# Patient Record
Sex: MALE | Race: WHITE | Employment: OTHER | URBAN - METROPOLITAN AREA
[De-identification: names, ages, dates, MRNs, and addresses within clinical notes are randomized per-mention and may not be internally consistent; named-entity substitution may affect disease eponyms.]

---

## 2018-04-08 PROBLEM — J98.9 EXTREME OBESITY WITH RESPIRATORY DISORDER (HCC): Status: ACTIVE | Noted: 2018-04-08

## 2018-04-08 PROBLEM — I10 BENIGN HYPERTENSION: Status: ACTIVE | Noted: 2018-04-08

## 2018-04-08 PROBLEM — E11.9 TYPE 2 DIABETES MELLITUS WITHOUT COMPLICATION, WITHOUT LONG-TERM CURRENT USE OF INSULIN (HCC): Status: ACTIVE | Noted: 2018-04-08

## 2018-04-08 PROBLEM — M51.37 DEGENERATION OF LUMBAR OR LUMBOSACRAL INTERVERTEBRAL DISC: Status: ACTIVE | Noted: 2018-04-08

## 2018-04-08 PROBLEM — M51.379 DEGENERATION OF LUMBAR OR LUMBOSACRAL INTERVERTEBRAL DISC: Status: ACTIVE | Noted: 2018-04-08

## 2018-04-08 PROBLEM — G47.33 OBSTRUCTIVE SLEEP APNEA (ADULT) (PEDIATRIC): Status: ACTIVE | Noted: 2018-04-08

## 2018-04-08 PROBLEM — I87.303 CHRONIC VENOUS HYPERTENSION INVOLVING BOTH SIDES: Status: ACTIVE | Noted: 2018-04-08

## 2018-04-08 PROBLEM — E78.1 PURE HYPERGLYCERIDEMIA: Status: ACTIVE | Noted: 2018-04-08

## 2018-04-08 PROBLEM — I50.32 CHRONIC DIASTOLIC HEART FAILURE (HCC): Status: ACTIVE | Noted: 2018-04-08

## 2018-04-08 PROBLEM — E66.01 EXTREME OBESITY WITH RESPIRATORY DISORDER (HCC): Status: ACTIVE | Noted: 2018-04-08

## 2018-04-08 PROBLEM — L57.0 ACTINIC KERATOSIS: Status: ACTIVE | Noted: 2018-04-08

## 2018-06-04 PROBLEM — N18.30 CKD (CHRONIC KIDNEY DISEASE) STAGE 3, GFR 30-59 ML/MIN (HCC): Status: ACTIVE | Noted: 2018-06-04

## 2018-07-19 PROCEDURE — 86803 HEPATITIS C AB TEST: CPT | Performed by: INTERNAL MEDICINE

## 2019-07-23 PROBLEM — E66.2 CLASS 3 OBESITY WITH ALVEOLAR HYPOVENTILATION, SERIOUS COMORBIDITY, AND BODY MASS INDEX (BMI) OF 45.0 TO 49.9 IN ADULT (HCC): Status: ACTIVE | Noted: 2019-07-23

## 2019-07-23 PROBLEM — E66.813 CLASS 3 OBESITY WITH ALVEOLAR HYPOVENTILATION, SERIOUS COMORBIDITY, AND BODY MASS INDEX (BMI) OF 45.0 TO 49.9 IN ADULT (HCC): Status: ACTIVE | Noted: 2019-07-23

## 2019-12-01 PROBLEM — E55.9 HYPOVITAMINOSIS D: Status: ACTIVE | Noted: 2019-12-01

## 2020-08-13 PROBLEM — I48.0 PAF (PAROXYSMAL ATRIAL FIBRILLATION) (HCC): Status: ACTIVE | Noted: 2020-08-13

## 2021-10-14 PROBLEM — E66.01 OBESITY, MORBID (HCC): Status: ACTIVE | Noted: 2019-07-23

## 2022-01-06 PROBLEM — E11.65 TYPE 2 DIABETES MELLITUS WITH HYPERGLYCEMIA, UNSPECIFIED WHETHER LONG TERM INSULIN USE (HCC): Status: ACTIVE | Noted: 2022-01-06

## 2022-01-06 PROBLEM — C61 PROSTATE CANCER (HCC): Status: ACTIVE | Noted: 2022-01-06

## 2025-04-28 RX ORDER — INSULIN DEGLUDEC 100 U/ML
44 INJECTION, SOLUTION SUBCUTANEOUS DAILY
COMMUNITY

## 2025-04-29 ENCOUNTER — ANESTHESIA (OUTPATIENT)
Dept: ENDOSCOPY | Facility: HOSPITAL | Age: 75
End: 2025-04-29
Payer: MEDICARE

## 2025-04-29 ENCOUNTER — ANESTHESIA EVENT (OUTPATIENT)
Dept: ENDOSCOPY | Facility: HOSPITAL | Age: 75
End: 2025-04-29
Payer: MEDICARE

## 2025-04-29 ENCOUNTER — HOSPITAL ENCOUNTER (OUTPATIENT)
Facility: HOSPITAL | Age: 75
Setting detail: HOSPITAL OUTPATIENT SURGERY
Discharge: HOME | End: 2025-04-29
Attending: INTERNAL MEDICINE | Admitting: INTERNAL MEDICINE
Payer: MEDICARE

## 2025-04-29 VITALS
HEART RATE: 56 BPM | HEIGHT: 68 IN | DIASTOLIC BLOOD PRESSURE: 60 MMHG | BODY MASS INDEX: 42.13 KG/M2 | OXYGEN SATURATION: 95 % | RESPIRATION RATE: 21 BRPM | WEIGHT: 278 LBS | SYSTOLIC BLOOD PRESSURE: 139 MMHG

## 2025-04-29 DIAGNOSIS — Z12.11 SCREENING FOR COLON CANCER: ICD-10-CM

## 2025-04-29 LAB — GLUCOSE BLDC GLUCOMTR-MCNC: 107 MG/DL (ref 70–99)

## 2025-04-29 PROCEDURE — 88305 TISSUE EXAM BY PATHOLOGIST: CPT | Performed by: INTERNAL MEDICINE

## 2025-04-29 PROCEDURE — 82962 GLUCOSE BLOOD TEST: CPT

## 2025-04-29 RX ORDER — SODIUM CHLORIDE, SODIUM LACTATE, POTASSIUM CHLORIDE, CALCIUM CHLORIDE 600; 310; 30; 20 MG/100ML; MG/100ML; MG/100ML; MG/100ML
INJECTION, SOLUTION INTRAVENOUS CONTINUOUS
Status: DISCONTINUED | OUTPATIENT
Start: 2025-04-29 | End: 2025-04-29

## 2025-04-29 RX ORDER — NALOXONE HYDROCHLORIDE 0.4 MG/ML
0.08 INJECTION, SOLUTION INTRAMUSCULAR; INTRAVENOUS; SUBCUTANEOUS ONCE AS NEEDED
Status: DISCONTINUED | OUTPATIENT
Start: 2025-04-29 | End: 2025-04-29

## 2025-04-29 RX ORDER — PHENYLEPHRINE HCL 10 MG/ML
VIAL (ML) INJECTION AS NEEDED
Status: DISCONTINUED | OUTPATIENT
Start: 2025-04-29 | End: 2025-04-29 | Stop reason: SURG

## 2025-04-29 RX ORDER — LIDOCAINE HYDROCHLORIDE 10 MG/ML
INJECTION, SOLUTION EPIDURAL; INFILTRATION; INTRACAUDAL; PERINEURAL AS NEEDED
Status: DISCONTINUED | OUTPATIENT
Start: 2025-04-29 | End: 2025-04-29 | Stop reason: SURG

## 2025-04-29 RX ADMIN — PHENYLEPHRINE HCL 200 MCG: 10 MG/ML VIAL (ML) INJECTION at 11:08:00

## 2025-04-29 RX ADMIN — LIDOCAINE HYDROCHLORIDE 100 MG: 10 INJECTION, SOLUTION EPIDURAL; INFILTRATION; INTRACAUDAL; PERINEURAL at 10:54:00

## 2025-04-29 NOTE — ANESTHESIA PREPROCEDURE EVALUATION
Anesthesia PreOp Note    HPI:     Haider Negro is a 75 year old male who presents for preoperative consultation requested by: Marie Rojo MD    Date of Surgery: 4/29/2025    Procedure(s):  COLONOSCOPY  Indication: Screening for colon cancer    Relevant Problems   No relevant active problems       NPO:                         History Review:  Patient Active Problem List    Diagnosis Date Noted    Type 2 diabetes mellitus with hyperglycemia, unspecified whether long term insulin use ([facility]) 01/06/2022    Prostate cancer ([facility]) 01/06/2022    PAF (paroxysmal atrial fibrillation) ([facility]) 08/13/2020    Hypovitaminosis D 12/01/2019    Obesity, morbid ([facility]) 07/23/2019    CKD (chronic kidney disease) stage 3, GFR 30-59 ml/min ([facility]) 06/04/2018    Benign hypertension 04/08/2018    Actinic keratosis 04/08/2018    Chronic venous hypertension involving both sides 04/08/2018    Obstructive sleep apnea (adult) (pediatric) 04/08/2018    Degeneration of lumbar or lumbosacral intervertebral disc 04/08/2018    Pure hyperglyceridemia 04/08/2018    Type 2 diabetes mellitus without complication, without long-term current use of insulin ([facility]) 04/08/2018    Chronic diastolic heart failure ([facility]) 04/08/2018       Past Medical History[1]    Past Surgical History[2]    Prescriptions Prior to Admission[3]  Current Medications and Prescriptions Ordered in Epic[4]    Allergies[5]    Family History[6]  Social Hx on file[7]    Available pre-op labs reviewed.             Vital Signs:  Body mass index is 42.27 kg/m².   height is 1.727 m (5' 8\") and weight is 126.1 kg (278 lb).   Vitals:    04/22/25 1317   Weight: 126.1 kg (278 lb)   Height: 1.727 m (5' 8\")        Anesthesia Evaluation     Patient summary reviewed and Nursing notes reviewed    Airway   Mallampati: II  TM distance: >3 FB  Neck ROM: full  Dental - Dentition appears grossly intact     Pulmonary - normal exam   (+) sleep apnea  Cardiovascular - normal exam  (+) hypertension,  dysrhythmias, CHF    ROS comment: Normal stress test in 2020    Neuro/Psych      GI/Hepatic/Renal      Comments: CKD stage 3    Endo/Other    (+) diabetes mellitus    Comments: M. OBESE  Abdominal   (+) obese                 Anesthesia Plan:   ASA:  3  Plan:   MAC  Informed Consent Plan and Risks Discussed With:  Patient      I have informed Haider LEAL Marky and/or legal guardian or family member of the nature of the anesthetic plan, benefits, risks including possible dental damage if relevant, major complications, and any alternative forms of anesthetic management.   All of the patient's questions were answered to the best of my ability. The patient desires the anesthetic management as planned.  Milton De La Rosa MD  4/29/2025 9:03 AM  Present on Admission:  **None**           [1]   Past Medical History:   Arrhythmia    history of a-fib    Back problem    Bilevel positive airway pressure (BPAP) dependence    Cataracts, both eyes    Chronic atrial fibrillation (HCC)    Chronic renal insufficiency, stage 3 (moderate) (HCC)    Congestive heart disease (HCC)    Degeneration of lumbar or lumbosacral intervertebral disc    Diabetes (HCC)    High blood pressure    High cholesterol    History of stomach ulcers    Non-ST elevation myocardial infarction (NSTEMI) (HCC)    KARINA     Pneumothorax on right    KARINA     Prostate cancer (HCC)    Refractive error    Sleep apnea    Visual impairment    glasses    Wears glasses   [2]   Past Surgical History:  Procedure Laterality Date    Ablation      Cardioversion      Colonoscopy      Insertion of chest tube  04/2021    KARINA taken out after 24 hours    [3]   No medications prior to admission.   [4]   No current Epic-ordered facility-administered medications on file.     Current Outpatient Medications Ordered in Epic   Medication Sig Dispense Refill    insulin degludec 100 units/mL Subcutaneous Solution Pen-injector Inject 44 Units into the skin daily.      cloNIDine 0.2 MG Oral Tab Take 1  tablet (0.2 mg total) by mouth 2 (two) times daily. 60 tablet 5    Icosapent Ethyl (VASCEPA) 1 g Oral Cap Take 2 capsules by mouth 2 (two) times daily. 360 capsule 3    triamcinolone 0.1 % External Cream Apply topically 2 (two) times daily as needed. 28.4 g 0    atorvastatin 80 MG Oral Tab Take 1 tablet (80 mg total) by mouth daily. 90 tablet 0    RYBELSUS 7 MG Oral Tab TAKE 1 TABLET BY MOUTH EVERY DAY 90 tablet 0    docusate sodium 100 MG Oral Cap       oxyCODONE 5 MG Oral Tab       FARXIGA 10 MG Oral Tab TAKE 1 TABLET BY MOUTH EVERY DAY 90 tablet 0    Metoprolol Succinate  MG Oral Tablet 24 Hr Take 1 tablet (200 mg total) by mouth daily. 90 tablet 0    Flecainide Acetate 150 MG Oral Tab 1/2 tablet in the morning and 1/2 tablet in the evening 90 tablet 3    ezetimibe 10 MG Oral Tab Take 1 tablet (10 mg total) by mouth nightly. 90 tablet 3    LOSARTAN POTASSIUM 50 MG Oral Tab TAKE 2 TABLETS BY MOUTH EVERY  tablet 3    apixaban (ELIQUIS) 5 MG Oral Tab Take 1 tablet (5 mg total) by mouth 2 (two) times daily. 180 tablet 3    Cholecalciferol (VITAMIN D) 50 MCG (2000 UT) Oral Cap Take 2.5 capsules (5,000 Units total) by mouth in the morning. 30 capsule 0    traMADol HCl 50 MG Oral Tab Take 1 tablet (50 mg total) by mouth daily. 20 tablet 0    Glucose Blood (ONETOUCH ULTRA BLUE) In Vitro Strip Inject 1 Stick into the skin daily. 100 strip 0    ONETOUCH LANCETS Does not apply Misc Inject 1 lancet into the skin daily. 100 each 0   [5]   Allergies  Allergen Reactions    Seasonal Runny nose     Itchy eyes,   [6]   Family History  Problem Relation Age of Onset    Colon Cancer Mother     Heart Disease Father    [7]   Social History  Socioeconomic History    Marital status:    Tobacco Use    Smoking status: Former     Types: Cigars     Quit date:      Years since quittin.3    Smokeless tobacco: Former   Vaping Use    Vaping status: Never Used   Substance and Sexual Activity    Alcohol use: Yes      Alcohol/week: 2.0 standard drinks of alcohol     Types: 1 Glasses of wine, 1 Standard drinks or equivalent per week     Comment: seldom drinks alcohol    Drug use: No

## 2025-04-29 NOTE — ANESTHESIA POSTPROCEDURE EVALUATION
Patient: Haider Negro    Procedure Summary       Date: 04/29/25 Room / Location: [facility] ENDOSCOPY 05 / [facility] ENDOSCOPY    Anesthesia Start: 1050 Anesthesia Stop: 1117    Procedure: COLONOSCOPY Diagnosis:       Screening for colon cancer      (polyp, hemorrhoids)    Surgeons: Marie Rojo MD Anesthesiologist: Milton De La Rosa MD    Anesthesia Type: MAC ASA Status: 3            Anesthesia Type: MAC    Vitals Value Taken Time   /50 04/29/25 11:15   Temp 97 04/29/25 11:17   Pulse 60 04/29/25 11:17   Resp 22 04/29/25 11:17   SpO2 94 % 04/29/25 11:17   Vitals shown include unfiled device data.    [facility] AN Post Evaluation:   Patient Evaluated in PACU  Patient Participation: complete - patient participated  Level of Consciousness: awake  Airway Patency:patent  Dental exam unchanged from preop  Yes    Nausea/Vomiting: none  Cardiovascular Status: acceptable  Respiratory Status: acceptable  Postoperative Hydration acceptable      Milton De La oRsa MD  4/29/2025 11:17 AM

## 2025-04-29 NOTE — H&P
HISTORY AND PHYSICAL FOR ENDOSCOPIC PROCEDURE      Haider Negro Patient Status:  Hospital Outpatient Surgery    1950 MRN [id number]   Location [facility] ENDOSCOPY LAB SUITES Attending Marie Rojo MD   Hosp Day # 0 PCP Deedee Pappas DO     Date of Consult: 2025    Reason for Consultation:  Screening cscope    History of Present Illness:  Haider Negro is a a(n) 75 year old male. Last cscope 10y ago. no FH of CRC. + (held)   blood thinners.  no Gi sx.      History:  Past Medical History[1]  Past Surgical History[2]  Family History[3]   reports that he quit smoking about 16 years ago. His smoking use included cigars. He has quit using smokeless tobacco. He reports current alcohol use of about 2.0 standard drinks of alcohol per week. He reports that he does not use drugs.    Allergies:  Allergies[4]    Medications:  Current Hospital Medications[5]    Review of Systems:  Gastrointestinal: negative other than specified in the HPI  General: negative other than specified in the HPI  Neurological: negative other than specified in the HPI  Cardiovascular: negative other than specified in the HPI  Respiratory: negative other than specified in the HPI  Skin: negative other than specified in the HPI  Allergy: negative other than specified in the HPI  ENT: negative other than specified in the HPI  Physical Exam:    Height 5' 8\" (1.727 m), weight 278 lb (126.1 kg).    General: Appears alert, oriented x3 and in no acute distress.  HEENT: Normal. No neck vein distention. Thyroid not enlarged.  No lymphadenopathy.  CV: S1 and S2 normal.  No murmurs or gallops.  Lungs: Clear to auscultation.  Abdomen: Soft and nondistended.  Nontender.  No masses.  Bowel sounds are present.  Back: No CVA tenderness.    Imaging:  none    Assessment/Plan:  Problem List[6]      Marie Rojo DO  2025  10:16 AM         [1]   Past Medical History:   Arrhythmia    history of a-fib    Back problem    Bilevel positive  airway pressure (BPAP) dependence    Cataracts, both eyes    Chronic atrial fibrillation (HCC)    Chronic renal insufficiency, stage 3 (moderate) ([facility])    Congestive heart disease ([facility])    Degeneration of lumbar or lumbosacral intervertebral disc    Diabetes ([facility])    High blood pressure    High cholesterol    History of stomach ulcers    Non-ST elevation myocardial infarction (NSTEMI) ([facility])    KARINA     Pneumothorax on right    KARINA     Prostate cancer ([facility])    Refractive error    Sleep apnea    Visual impairment    glasses    Wears glasses   [2]   Past Surgical History:  Procedure Laterality Date    Ablation      Cardioversion      Colonoscopy      Insertion of chest tube  04/2021    KARINA taken out after 24 hours    [3]   Family History  Problem Relation Age of Onset    Colon Cancer Mother     Heart Disease Father    [4]   Allergies  Allergen Reactions    Seasonal Runny nose     Itchy eyes,   [5]   Current Facility-Administered Medications:     lactated ringers infusion, , Intravenous, Continuous  [6]   Patient Active Problem List  Diagnosis    Benign hypertension    Actinic keratosis    Chronic venous hypertension involving both sides    Obstructive sleep apnea (adult) (pediatric)    Degeneration of lumbar or lumbosacral intervertebral disc    Pure hyperglyceridemia    Type 2 diabetes mellitus without complication, without long-term current use of insulin ([facility])    Chronic diastolic heart failure ([facility])    CKD (chronic kidney disease) stage 3, GFR 30-59 ml/min ([facility])    Obesity, morbid ([facility])    Hypovitaminosis D    PAF (paroxysmal atrial fibrillation) ([facility])    Type 2 diabetes mellitus with hyperglycemia, unspecified whether long term insulin use ([facility])    Prostate cancer ([facility])

## 2025-04-29 NOTE — OPERATIVE REPORT
Colonoscopy Operative Report    Haider Negro Patient Status:  [facility] Outpatient Surgery    1950 MRN [id number]   Location [facility] ENDOSCOPY LAB SUITES Attending Marie Rojo MD   Hosp Day #   0 PCP Deedee Pappas DO     Pre-Operative Diagnosis: Screening for colon cancer    Post-Operative Diagnosis:  2mm sessile rectal polyp resected with cold forceps  Otherwise normal colon mucosa  Small nonbleeding hemorrhoids    Procedure Performed: COLONOSCOPY with cold forceps polypectomy    Informed Consent: Informed consent for both the procedure and sedation were obtained from the patient. The potentially life-threatening complications of sedation, bleeding,  perforation, transfusion or repeat endoscopy  were reviewed along with the possible need for hospitalization, surgical management, transfusion or repeat endoscopy should one of these complications arise. The patient understands and is agreeable to proceed.  Sedation Type: MAC-Patient received sedation with monitored anesthesia provided by an anesthesiologist  Moderate Sedation Time: None.  Deep sedation provided by anesthesia.  Cecum Withdrawal Time:  8 min  Date of previous colonoscopy: 10y ago    Procedure Description: The patient was placed in the left lateral decubitus position.  After careful digital rectal examination, the Adult colonoscope was inserted into the rectum and advanced to the level of the cecum under direct visualization. The cecum was identified by landmarks, including the appendiceal orifice and ileoceccal valve. Careful examination of the entire colon was performed during withdrawal of the endoscope. The scope was withdrawn to the rectum and retroflexion was performed.  The patient tolerated the procedure well with no immediate complications. The patient was transferred to the recovery area in stable condition.  Quality of Preparation: Adequate  Aronchick Bowel Prep Scale:  2  Findings:   2mm sessile rectal polyp resected  with cold forceps  Otherwise normal colon mucosa  Small nonbleeding hemorrhoids  Recommendations: f/u path  Discharge:  The patient was given an after visit summary detailing the procedure, findings, recommendations and follow up plans.     Marie Rojo DO  4/29/2025  11:15 AM

## (undated) DEVICE — FORCEPS BX LG 2.4MM X 240CM NDL RAD JAW 4

## (undated) DEVICE — 60 ML SYRINGE REGULAR TIP: Brand: MONOJECT

## (undated) DEVICE — MEDI-VAC NON-CONDUCTIVE SUCTION TUBING 6MM X 1.8M (6FT.) L: Brand: CARDINAL HEALTH

## (undated) DEVICE — V2 SPECIMEN COLLECTION MANIFOLD KIT: Brand: NEPTUNE

## (undated) DEVICE — KIT CLEAN ENDOKIT 1.1OZ GOWNX2

## (undated) DEVICE — Device

## (undated) DEVICE — KIT ENDO ORCAPOD 160/180/190

## (undated) NOTE — LETTER
[facility]  Administration of Anesthesia  I, Haider Negro agree to be cared for by a physician anesthesiologist alone and/or with a nurse anesthetist, who is specially trained to monitor me and give me medicine to put me to sleep or keep me comfortable during my procedure    I understand that my anesthesiologist and/or anesthetist is not an employee or agent of [facility] or [employer]. He or she works for [facility]    As the patient asking for anesthesia services, I agree to:  Allow the anesthesiologist (anesthesia doctor) to give me medicine and do additional procedures as necessary. Some examples are: Starting or using an “IV” to give me medicine, fluids or blood during my procedure, and having a breathing tube placed to help me breathe when I’m asleep (intubation). In the event that my heart stops working properly, I understand that my anesthesiologist will make every effort to sustain my life, unless otherwise directed by [facility] Do Not Resuscitate documents.  Tell my anesthesia doctor before my procedure:  If I am pregnant.  The last time that I ate or drank.  iii. All of the medicines I take (including prescriptions, herbal supplements, and pills I can buy without a prescription (including street drugs/illegal medications). Failure to inform my anesthesiologist about these medicines may increase my risk of anesthetic complications.  iv.If I am allergic to anything or have had a reaction to anesthesia before.  I understand how the anesthesia medicine will help me (benefits).  I understand that with any type of anesthesia medicine there are risks:  The most common risks are: nausea, vomiting, sore throat, muscle soreness, damage to my eyes, mouth, or teeth (from breathing tube placement).  Rare risks include: remembering what happened during my procedure, allergic reactions to medications, injury to my airway, heart, lungs, vision, nerves, or  muscles and in extremely rare instances death.  My doctor has explained to me other choices available to me for my care (alternatives).  Pregnant Patients (“epidural”):  I understand that the risks of having an epidural (medicine given into my back to help control pain during labor), include itching, low blood pressure, difficulty urinating, headache or slowing of the baby’s heart. Very rare risks include infection, bleeding, seizure, irregular heart rhythms and nerve injury.  Regional Anesthesia (“spinal”, “epidural”, & “nerve blocks”):  I understand that rare but potential complications include headache, bleeding, infection, seizure, irregular heart rhythms, and nerve injury.    _____________________________________________________________________________  Patient (or Representative) Signature/Relationship to Patient  Date   Time    _____________________________________________________________________________   Name (if used)    Language/Organization   Time    _____________________________________________________________________________  Nurse Anesthetist Signature     Date   Time  _____________________________________________________________________________  Anesthesiologist Signature     Date   Time  I have discussed the procedure and information above with the patient (or patient’s representative) and answered their questions. The patient or their representative has agreed to have anesthesia services.    _____________________________________________________________________________  Witness        Date   Time  I have verified that the signature is that of the patient or patient’s representative, and that it was signed before the procedure  Patient Name: Haider Negro     : 1950                 Printed: 2025 at 9:21 AM    Medical Record #: [id number]                                            Page 1 of 1  ----------ANESTHESIA CONSENT----------

## (undated) NOTE — LETTER
[facility]  [address], [place], [place]    Authorization for Surgical Operation and Procedure                               I hereby authorize Marie Rojo MD, my physician and his/her assistants (if applicable), which may include medical students, residents, and/or fellows, to perform the following surgical operation/ procedure and administer such anesthesia as may be determined necessary by my physician: Operation/Procedure name (s) COLONOSCOPY on Haider Negro   2.   I recognize that during the surgical operation/procedure, unforeseen conditions may necessitate additional or different procedures than those listed above.  I, therefore, further authorize and request that the above-named surgeon, assistants, or designees perform such procedures as are, in their judgment, necessary and desirable.    3.   My surgeon/physician has discussed prior to my surgery the potential benefits, risks and side effects of this procedure; the likelihood of achieving goals; and potential problems that might occur during recuperation.  They also discussed reasonable alternatives to the procedure, including risks, benefits, and side effects related to the alternatives and risks related to not receiving this procedure.  I have had all my questions answered and I acknowledge that no guarantee has been made as to the result that may be obtained.    4.   Should the need arise during my operation/procedure, which includes change of level of care prior to discharge, I also consent to the administration of blood and/or blood products.  Further, I understand that despite careful testing and screening of blood or blood products by collecting agencies, I may still be subject to ill effects as a result of receiving a blood transfusion and/or blood products.  The following are some, but not all, of the potential risks that can occur: fever and allergic reactions, hemolytic reactions, transmission of diseases such as  Hepatitis, AIDS and Cytomegalovirus (CMV) and fluid overload.  In the event that I wish to have an autologous transfusion of my own blood, or a directed donor transfusion, I will discuss this with my physician.  Check only if Refusing Blood or Blood Products  I understand refusal of blood or blood products as deemed necessary by my physician may have serious consequences to my condition to include possible death. I hereby assume responsibility for my refusal and release the hospital, its personnel, and my physicians from any responsibility for the consequences of my refusal.    o  Refuse   5.   I authorize the use of any specimen, organs, tissues, body parts or foreign objects that may be removed from my body during the operation/procedure for diagnosis, research or teaching purposes and their subsequent disposal by hospital authorities.  I also authorize the release of specimen test results and/or written reports to my treating physician on the hospital medical staff or other referring or consulting physicians involved in my care, at the discretion of the Pathologist or my treating physician.    6.   I consent to the photographing or videotaping of the operations or procedures to be performed, including appropriate portions of my body for medical, scientific, or educational purposes, provided my identity is not revealed by the pictures or by descriptive texts accompanying them.  If the procedure has been photographed/videotaped, the surgeon will obtain the original picture, image, videotape or CD.  The hospital will not be responsible for storage, release or maintenance of the picture, image, tape or CD.    7.   I consent to the presence of a  or observers in the operating room as deemed necessary by my physician or their designees.    8.   I recognize that in the event my procedure results in extended X-Ray/fluoroscopy time, I may develop a skin reaction.    9. If I have a Do Not Attempt  Resuscitation (DNAR) order in place, that status will be suspended while in the operating room, procedural suite, and during the recovery period unless otherwise explicitly stated by me (or a person authorized to consent on my behalf). The surgeon or my attending physician will determine when the applicable recovery period ends for purposes of reinstating the DNAR order.  10. Patients having a sterilization procedure: I understand that if the procedure is successful the results will be permanent and it will therefore be impossible for me to inseminate, conceive, or bear children.  I also understand that the procedure is intended to result in sterility, although the result has not been guaranteed.   11. I acknowledge that my physician has explained sedation/analgesia administration to me including the risk and benefits I consent to the administration of sedation/analgesia as may be necessary or desirable in the judgment of my physician.    I CERTIFY THAT I HAVE READ AND FULLY UNDERSTAND THE ABOVE CONSENT TO OPERATION and/or OTHER PROCEDURE.     ____________________________________  _________________________________        ______________________________  Signature of Patient    Signature of Responsible Person                Printed Name of Responsible Person                                      ____________________________________  _____________________________                ________________________________  Signature of Witness        Date  Time         Relationship to Patient    STATEMENT OF PHYSICIAN My signature below affirms that prior to the time of the procedure; I have explained to the patient and/or his/her legal representative, the risks and benefits involved in the proposed treatment and any reasonable alternative to the proposed treatment. I have also explained the risks and benefits involved in refusal of the proposed treatment and alternatives to the proposed treatment and have answered the patient's  questions. If I have a significant financial interest in a co-management agreement or a significant financial interest in any product or implant, or other significant relationship used in this procedure/surgery, I have disclosed this and had a discussion with my patient.     _____________________________________________________              _____________________________  (Signature of Physician)                                                                                         (Date)                                   (Time)  Patient Name: Haider Negro      : 1950      Printed: 2025     Medical Record #: [id number]                                      Page 1 of 1